# Patient Record
Sex: MALE | URBAN - METROPOLITAN AREA
[De-identification: names, ages, dates, MRNs, and addresses within clinical notes are randomized per-mention and may not be internally consistent; named-entity substitution may affect disease eponyms.]

---

## 2020-07-01 ENCOUNTER — OFFICE VISIT (OUTPATIENT)
Dept: URBAN - METROPOLITAN AREA CLINIC 75 | Facility: CLINIC | Age: 85
End: 2020-07-01

## 2020-07-01 DIAGNOSIS — H35.342 MACULAR HOLE OF LT EYE: ICD-10-CM

## 2020-07-01 DIAGNOSIS — H25.11 AGE-RELATED NUCLEAR CATARACT, RIGHT EYE: Primary | ICD-10-CM

## 2020-07-01 DIAGNOSIS — H35.3210 EXUDATIVE AGE-RELATED MACULAR DEGENERATION OF RIGHT EYE: ICD-10-CM

## 2020-07-01 PROCEDURE — 99204 OFFICE O/P NEW MOD 45 MIN: CPT | Performed by: OPTOMETRIST

## 2020-07-01 ASSESSMENT — INTRAOCULAR PRESSURE
OD: 14
OS: 12

## 2020-07-06 NOTE — IMPRESSION/PLAN
Impression: Macular hole of lt eye: H35.342. Plan: A macular hole is a small break in the macula, located in the center of the eye's light-sensitive tissue called the retina. The macula provides the sharp, central vision we need for reading, driving, and seeing fine detail. A macular hole can cause blurred and distorted central vision. 

Recommend PT see a retina specialist.

## 2020-07-06 NOTE — IMPRESSION/PLAN
Impression: Age-related nuclear cataract, right eye: H25.11. Plan: Cataracts do not require treatment unless they interfere with vision and impact one's activities of daily living, in which case cataract extraction with lens implant insertion should be performed. Cataracts occur in everyone as they age. Contact office if you experience progressive loss of vision, increasing glare, and problems with daily activities such as driving, reading, watching tv, seeing street signs, or following a golf ball. Recommend SX OD. Pt wishes to defer as he is currently uninsured in [facility]. Advised Pt he is not legal to drive with his current visual acuities. New glasses RX is a negligible improvement. Pt needs to see a retina specialist and have cataract SX to improve vision.

## 2021-06-19 NOTE — IMPRESSION/PLAN
Impression: Exudative age-related macular degeneration of right eye: H35.5246. Plan: Wet macular degeneration is a chronic eye disorder that causes blurred vision or a blind spot in your visual field. It's generally caused by abnormal blood vessels that leak fluid or blood into the macula Recommend seeing a retina specialist.  Pt currently is uninsured in [id number] [facility]. Pt will get insurance situation figured out before proceeding. numerical 0-10